# Patient Record
Sex: FEMALE | Race: WHITE | NOT HISPANIC OR LATINO | Employment: UNEMPLOYED | URBAN - NONMETROPOLITAN AREA
[De-identification: names, ages, dates, MRNs, and addresses within clinical notes are randomized per-mention and may not be internally consistent; named-entity substitution may affect disease eponyms.]

---

## 2023-08-24 ENCOUNTER — HOSPITAL ENCOUNTER (EMERGENCY)
Facility: HOSPITAL | Age: 58
Discharge: HOME | End: 2023-08-24
Attending: EMERGENCY MEDICINE
Payer: MEDICAID

## 2023-08-24 VITALS
RESPIRATION RATE: 20 BRPM | DIASTOLIC BLOOD PRESSURE: 101 MMHG | SYSTOLIC BLOOD PRESSURE: 211 MMHG | TEMPERATURE: 98 F | WEIGHT: 293 LBS | HEART RATE: 89 BPM | OXYGEN SATURATION: 98 % | HEIGHT: 66 IN | BODY MASS INDEX: 47.09 KG/M2

## 2023-08-24 DIAGNOSIS — I10 HYPERTENSION, UNSPECIFIED TYPE: ICD-10-CM

## 2023-08-24 DIAGNOSIS — K04.7 DENTAL INFECTION: Primary | ICD-10-CM

## 2023-08-24 PROCEDURE — 25000003 PHARM REV CODE 250

## 2023-08-24 PROCEDURE — 99284 EMERGENCY DEPT VISIT MOD MDM: CPT

## 2023-08-24 RX ORDER — AMLODIPINE BESYLATE 10 MG/1
10 TABLET ORAL
Status: COMPLETED | OUTPATIENT
Start: 2023-08-24 | End: 2023-08-24

## 2023-08-24 RX ORDER — AMLODIPINE BESYLATE 10 MG/1
10 TABLET ORAL DAILY
Qty: 30 TABLET | Refills: 0 | Status: SHIPPED | OUTPATIENT
Start: 2023-08-24 | End: 2024-08-23

## 2023-08-24 RX ORDER — AMOXICILLIN AND CLAVULANATE POTASSIUM 875; 125 MG/1; MG/1
1 TABLET, FILM COATED ORAL 2 TIMES DAILY
Qty: 28 TABLET | Refills: 0 | Status: SHIPPED | OUTPATIENT
Start: 2023-08-24 | End: 2023-09-07

## 2023-08-24 RX ADMIN — AMLODIPINE BESYLATE 10 MG: 10 TABLET ORAL at 10:08

## 2023-08-24 NOTE — DISCHARGE INSTRUCTIONS
Take antibiotics twice a day for 14 days.  Take the blood pressure medicine daily.  Please establish primary care for management of your blood pressure.  You can take Tylenol and ibuprofen as needed for pain.

## 2023-08-24 NOTE — ED PROVIDER NOTES
Encounter Date: 8/24/2023       History     Chief Complaint   Patient presents with    Abscess     Pt reports left upper jaw tooth abscess on the that began this morning. Pt reports face swelling. Denies drainage. Denies pain.      58-year-old female with no reported past medical history to ED for evaluation of swelling to the left side of her face.  She reports a loose tooth and has little slight painful discomfort.  No medication or treatment attempted.  Patient has not seen a dentist in 28 years.  She denies any chest pain or shortness of breath.  She does report being told that she has had high blood pressure in the past but has never followed up.  Patient is currently not on any kind of medication.    The history is provided by the patient.     Review of patient's allergies indicates:  No Known Allergies  No past medical history on file.  No past surgical history on file.  No family history on file.     Review of Systems   Constitutional:  Negative for fever.   HENT:  Positive for dental problem. Negative for sore throat.    Eyes: Negative.    Respiratory:  Negative for shortness of breath.    Cardiovascular:  Negative for chest pain.   Gastrointestinal:  Negative for nausea.   Endocrine: Negative.    Genitourinary:  Negative for dysuria.   Musculoskeletal:  Negative for back pain.   Skin:  Negative for rash.   Allergic/Immunologic: Negative.    Neurological:  Negative for weakness.   Hematological:  Does not bruise/bleed easily.   Psychiatric/Behavioral: Negative.         Physical Exam     Initial Vitals [08/24/23 1033]   BP Pulse Resp Temp SpO2   (!) 211/101 89 20 98.4 °F (36.9 °C) 98 %      MAP       --         Physical Exam    Nursing note and vitals reviewed.  Constitutional: She appears well-developed and well-nourished.   HENT:   Head: Normocephalic and atraumatic.   Mouth/Throat: Uvula is midline. She has dentures. No trismus in the jaw. Abnormal dentition. Dental caries present. No dental abscesses.    Eyes: EOM are normal.   Neck: Neck supple.   Normal range of motion.  Cardiovascular:  Normal rate and regular rhythm.           Pulmonary/Chest: No respiratory distress.   Abdominal: She exhibits no distension.   Musculoskeletal:         General: Normal range of motion.      Cervical back: Normal range of motion and neck supple.     Neurological: She is alert and oriented to person, place, and time.   Skin: Skin is warm and dry.   Psychiatric: Thought content normal.         ED Course   Procedures  Labs Reviewed - No data to display       Imaging Results    None          Medications   amLODIPine tablet 10 mg (10 mg Oral Given 8/24/23 1045)     Medical Decision Making  58-year-old morbidly obese patient with no reported past medical history presents ED for above complaints.  No signs of respiratory distress noted.  Patient does have some swelling to left side of face.  She has a decaying left upper molar tooth. It is loose in it's socket. No inner oral swelling noted. No trismus noted. No efren's noted. Will treat with Augmentin and advised to follow up with Dentist for removal. Additionally pt was hypertensive in ED and was not complaining of chest pain, SOB, headaches, blurred vision. She has been told in the past she had high blood pressure and needed medications, however she has never established primary care. Pt reports last primary care visit was greater than 20 years ago. I started pt on amlodipine 10 mg while in ED. She was advised to start medications daily and to establish primary care without fail. Strict return precautions were given.                               Clinical Impression:   Final diagnoses:  [K04.7] Dental infection (Primary)  [I10] Hypertension, unspecified type               Vic Zarco NP  08/24/23 2478